# Patient Record
Sex: FEMALE | Race: BLACK OR AFRICAN AMERICAN | Employment: UNEMPLOYED | URBAN - METROPOLITAN AREA
[De-identification: names, ages, dates, MRNs, and addresses within clinical notes are randomized per-mention and may not be internally consistent; named-entity substitution may affect disease eponyms.]

---

## 2017-07-13 ENCOUNTER — HOSPITAL ENCOUNTER (EMERGENCY)
Facility: HOSPITAL | Age: 22
Discharge: HOME | End: 2017-07-13
Attending: EMERGENCY MEDICINE
Payer: MEDICAID

## 2017-07-13 VITALS
HEIGHT: 59 IN | DIASTOLIC BLOOD PRESSURE: 69 MMHG | TEMPERATURE: 98 F | BODY MASS INDEX: 19.56 KG/M2 | RESPIRATION RATE: 18 BRPM | OXYGEN SATURATION: 100 % | HEART RATE: 112 BPM | WEIGHT: 97 LBS | SYSTOLIC BLOOD PRESSURE: 142 MMHG

## 2017-07-13 DIAGNOSIS — R51.9 NONINTRACTABLE EPISODIC HEADACHE, UNSPECIFIED HEADACHE TYPE: Primary | ICD-10-CM

## 2017-07-13 LAB — B-HCG UR QL: NEGATIVE

## 2017-07-13 PROCEDURE — 81025 URINE PREGNANCY TEST: CPT

## 2017-07-13 PROCEDURE — 99283 EMERGENCY DEPT VISIT LOW MDM: CPT

## 2017-07-13 RX ORDER — IBUPROFEN 600 MG/1
600 TABLET ORAL ONCE
Status: COMPLETED | OUTPATIENT
Start: 2017-07-13 | End: 2017-07-13

## 2017-07-13 RX ORDER — IBUPROFEN 200 MG
200 TABLET ORAL EVERY 8 HOURS PRN
COMMUNITY

## 2017-07-13 RX ORDER — ACETAMINOPHEN 325 MG/1
650 TABLET ORAL ONCE
Status: COMPLETED | OUTPATIENT
Start: 2017-07-13 | End: 2017-07-13

## 2017-07-13 RX ORDER — BUTALBITAL, ACETAMINOPHEN AND CAFFEINE 50; 325; 40 MG/1; MG/1; MG/1
1-2 TABLET ORAL EVERY 4 HOURS PRN
Qty: 20 TABLET | Refills: 0 | Status: SHIPPED | OUTPATIENT
Start: 2017-07-13

## 2017-07-13 NOTE — ED INITIAL ASSESSMENT (HPI)
Pt c/o right sided headache. Medicated with 200 mg of Ibuprofen yesterday. States she was evaluated at [facility] ER in the past for headache and was given a CT scan.

## 2017-07-14 NOTE — ED NOTES
Pt describes having pain to occipital lobe of head intermittently X 3 weeks now worse at night. Pt denies any visual changes, nausea or vomiting. Not sensitive to light, smells or noise. Pt describes the sensation as \"it was getting hot.  Ice usually helps

## 2017-07-14 NOTE — ED PROVIDER NOTES
Patient Seen in: [facility] Emergency Department    History   Patient presents with:  Headache (neurologic)      HPI    The patient presents complaining of a right-sided headache.   She states that symptoms started roughly 3 weeks ago and pain has °C)  Temp src: Oral  SpO2: 100 %  O2 Device: None (Room air)    Physical Exam   Constitutional: She is oriented to person, place, and time. She appears well-developed and well-nourished. No distress. HENT:   Head: Normocephalic and atraumatic.    Eyes: Co summaries, testing, and procedures and reviewed those reports. Complicating Factors: The patient already has  does not have a problem list on file. to contribute to the complexity of this ED evaluation.     ED Course: Patient presents with intermittent h

## 2018-09-01 ENCOUNTER — HOSPITAL ENCOUNTER (EMERGENCY)
Facility: HOSPITAL | Age: 23
Discharge: HOME | End: 2018-09-01
Payer: COMMERCIAL

## 2018-09-01 VITALS
OXYGEN SATURATION: 97 % | RESPIRATION RATE: 18 BRPM | TEMPERATURE: 99 F | HEART RATE: 111 BPM | SYSTOLIC BLOOD PRESSURE: 128 MMHG | BODY MASS INDEX: 27.16 KG/M2 | WEIGHT: 125.88 LBS | DIASTOLIC BLOOD PRESSURE: 94 MMHG | HEIGHT: 57 IN

## 2018-09-01 DIAGNOSIS — N61.0 CELLULITIS OF FEMALE BREAST: Primary | ICD-10-CM

## 2018-09-01 LAB — B-HCG UR QL: NEGATIVE

## 2018-09-01 PROCEDURE — 81025 URINE PREGNANCY TEST: CPT

## 2018-09-01 PROCEDURE — 99283 EMERGENCY DEPT VISIT LOW MDM: CPT

## 2018-09-01 RX ORDER — CEPHALEXIN 500 MG/1
500 CAPSULE ORAL 4 TIMES DAILY
Qty: 28 CAPSULE | Refills: 0 | Status: SHIPPED | OUTPATIENT
Start: 2018-09-01 | End: 2018-09-08

## 2018-09-01 NOTE — ED NOTES
Pt here for painful breasts. Pt states she has had painful breasts x 1 week. Pt denies breastfeeding, denies pregnancy, states she is mid cycle in her period.  Pt states that her right breast feels more firm than the left and that she has expressed clear fl

## 2018-09-01 NOTE — ED PROVIDER NOTES
Patient Seen in: [facility] Emergency Department    History   CC: breast pain  HPI: Meron Jack 23year old female  who presents to the ER c/o right-sided lateral breast pain as well as some redness and reported clear discharge from her nipple in discharge noted, no periorbital edema  ENT - EAC bilaterally without discharge  Oropharynx clear, voice is clear  Neck - no significant adenopathy, supple with trachea midline  Breast - no sig axillary adenopathy, no swelling noted. No dx expressed.  +minim as of 9/1/2018  6:23 PM    START taking these medications    cephALEXin (KEFLEX) 500 MG Oral Cap  Take 1 capsule (500 mg total) by mouth 4 (four) times daily. , Normal, Disp-28 capsule, R-0

## 2020-03-13 ENCOUNTER — APPOINTMENT (OUTPATIENT)
Dept: GENERAL RADIOLOGY | Facility: HOSPITAL | Age: 25
End: 2020-03-13
Attending: EMERGENCY MEDICINE
Payer: COMMERCIAL

## 2020-03-13 PROCEDURE — 71045 X-RAY EXAM CHEST 1 VIEW: CPT | Performed by: EMERGENCY MEDICINE

## 2020-03-14 NOTE — ED NOTES
Patient presents to ER with c/o mid-sternal chest pain since Tuesday. States the pain has gotten worse today. Denies SOB or COLEMAN. Patient states she is has a lot of anxiety at this time.  Family at bedside

## 2020-03-14 NOTE — ED PROVIDER NOTES
Patient Seen in: [facility] AND [facility] Emergency Department      History   No chief complaint on file.     Stated Complaint: Chest Pain    HPI    33-year-old female with no significant past medical history here with complaints of chest pain for the past 3 day Physical Exam  Vitals signs and nursing note reviewed. Constitutional:       General: She is not in acute distress. Appearance: She is well-developed. She is not diaphoretic. HENT:      Head: Normocephalic and atraumatic.    Eyes:      Pupil 137 136 - 145 mmol/L    Potassium 3.5 3.5 - 5.1 mmol/L    Chloride 103 98 - 112 mmol/L    CO2 27.0 21.0 - 32.0 mmol/L    Anion Gap 7 0 - 18 mmol/L    BUN 9 7 - 18 mg/dL    Creatinine 0.75 0.55 - 1.02 mg/dL    BUN/CREA Ratio 12.0 10.0 - 20.0    Calcium, Tot evaluation.      24yoF with chest pain  - I personally reviewed and interpreted all the ED vitals  - afebrile, hemodynamically stable  - I ordered and personally reviewed the labs and imaging and found no leukocytosis, no anemia, negative pregnancy test, tr

## 2020-04-03 ENCOUNTER — HOSPITAL ENCOUNTER (EMERGENCY)
Facility: HOSPITAL | Age: 25
Discharge: HOME | End: 2020-04-03
Attending: EMERGENCY MEDICINE
Payer: COMMERCIAL

## 2020-04-03 VITALS
OXYGEN SATURATION: 99 % | DIASTOLIC BLOOD PRESSURE: 85 MMHG | RESPIRATION RATE: 18 BRPM | SYSTOLIC BLOOD PRESSURE: 128 MMHG | WEIGHT: 140 LBS | TEMPERATURE: 98 F | HEART RATE: 90 BPM | BODY MASS INDEX: 30 KG/M2

## 2020-04-03 DIAGNOSIS — J02.0 STREP PHARYNGITIS: Primary | ICD-10-CM

## 2020-04-03 PROCEDURE — 99283 EMERGENCY DEPT VISIT LOW MDM: CPT

## 2020-04-03 PROCEDURE — 87430 STREP A AG IA: CPT

## 2020-04-03 RX ORDER — CEFAZOLIN SODIUM/WATER 2 G/20 ML
SYRINGE (ML) INTRAVENOUS
Status: COMPLETED
Start: 2020-04-03 | End: 2020-04-03

## 2020-04-03 RX ORDER — PENICILLIN V POTASSIUM 500 MG/1
500 TABLET ORAL 4 TIMES DAILY
Qty: 40 TABLET | Refills: 0 | Status: SHIPPED | OUTPATIENT
Start: 2020-04-03 | End: 2020-04-13

## 2020-04-03 NOTE — ED PROVIDER NOTES
Patient Seen in: [facility] Emergency Department      History   Patient presents with:  Sore Throat    Stated Complaint: sore throat    HPI    28 y/o female w/ 5 days of sore throat without other associated symptoms of cough and fever.   She denies Alert and oriented to person, place, and time. Skin: Skin is warm and dry. Psychiatric: Normal mood and affect. Behavior is normal.   Nursing note and vitals reviewed.     Differential diagnosis includes viral syndrome, viral pharyngitis or strep phary

## 2021-01-10 ENCOUNTER — HOSPITAL ENCOUNTER (OUTPATIENT)
Age: 26
Discharge: HOME | End: 2021-01-10
Payer: COMMERCIAL

## 2021-01-10 VITALS
HEIGHT: 59 IN | RESPIRATION RATE: 18 BRPM | DIASTOLIC BLOOD PRESSURE: 81 MMHG | WEIGHT: 130 LBS | OXYGEN SATURATION: 100 % | BODY MASS INDEX: 26.21 KG/M2 | TEMPERATURE: 98 F | HEART RATE: 102 BPM | SYSTOLIC BLOOD PRESSURE: 118 MMHG

## 2021-01-10 DIAGNOSIS — U07.1 COVID-19: Primary | ICD-10-CM

## 2021-01-10 LAB
POCT INFLUENZA A: NEGATIVE
POCT INFLUENZA B: NEGATIVE
SARS-COV-2 RNA RESP QL NAA+PROBE: DETECTED

## 2021-01-10 PROCEDURE — 87502 INFLUENZA DNA AMP PROBE: CPT | Performed by: NURSE PRACTITIONER

## 2021-01-10 PROCEDURE — 99213 OFFICE O/P EST LOW 20 MIN: CPT

## 2021-01-10 NOTE — ED PROVIDER NOTES
Patient Seen in: [facility]      History   Patient presents with:   Body ache and/or chills    Stated Complaint: flu symptoms    HPI/Subjective:   HPI    82-year-old female with no significant past medical history here today with body aches, \ bilaterally without wheezes, rhonchi, crackles. No accessory muscle use or tachypnea. Abdomen: Soft, nontender, nondistended. Active bowel sounds present. Back: No CVA tenderness. Extremities: No edema. Pulses 2+ extremities.    Brisk cap

## 2021-10-02 ENCOUNTER — HOSPITAL ENCOUNTER (OUTPATIENT)
Age: 26
Discharge: HOME | End: 2021-10-02
Payer: COMMERCIAL

## 2021-10-02 VITALS
OXYGEN SATURATION: 100 % | SYSTOLIC BLOOD PRESSURE: 133 MMHG | TEMPERATURE: 98 F | DIASTOLIC BLOOD PRESSURE: 77 MMHG | RESPIRATION RATE: 18 BRPM | HEART RATE: 102 BPM

## 2021-10-02 DIAGNOSIS — Z20.822 LAB TEST NEGATIVE FOR COVID-19 VIRUS: ICD-10-CM

## 2021-10-02 DIAGNOSIS — J02.9 VIRAL PHARYNGITIS: Primary | ICD-10-CM

## 2021-10-02 DIAGNOSIS — R51.9 ACUTE NONINTRACTABLE HEADACHE, UNSPECIFIED HEADACHE TYPE: ICD-10-CM

## 2021-10-02 PROCEDURE — 87880 STREP A ASSAY W/OPTIC: CPT

## 2021-10-02 PROCEDURE — 99212 OFFICE O/P EST SF 10 MIN: CPT

## 2021-10-02 PROCEDURE — 99213 OFFICE O/P EST LOW 20 MIN: CPT

## 2021-10-02 NOTE — ED PROVIDER NOTES
Patient Seen in: [facility]      History   Patient presents with:  Sore Throat  Headache    Stated Complaint: Covid test    Subjective:   HPI    This is a 70-year-old female presenting with sore throat and headache.   Patient states, for 3 to General: Normal range of motion. Cervical back: Normal range of motion. Skin:     General: Skin is warm and dry. Capillary Refill: Capillary refill takes less than 2 seconds. Neurological:      General: No focal deficit present.       Mental

## (undated) NOTE — ED AVS SNAPSHOT
Devante Waldrop   MRN: [id number]    Department:  [facility] Emergency Department   Date of Visit:  9/1/2018           Disclosure     Insurance plans vary and the physician(s) referred by the ER may not be covered by your plan.  Please contact your CARE PHYSICIAN AT ONCE OR RETURN IMMEDIATELY TO THE EMERGENCY DEPARTMENT. If you have been prescribed any medication(s), please fill your prescription right away and begin taking the medication(s) as directed.   If you believe that any of the medications

## (undated) NOTE — LETTER
[facility] [address] [place] [address]  [phone]     Patient: Jean Pierre Alejandre   YOB: 1995   Date of Visit: 1/10/2021     Dear Employer,        January 10, 2021    At [facility], we are taking special precaut Persons infected with SARS-CoV-2 who never develop COVID-19 symptoms may discontinue isolation and other precautions 10 days after the date of their first positive RT-PCR test for SARS-CoV-2 RNA.     Persons who are asymptomatic but have been exposed, CDC r

## (undated) NOTE — ED AVS SNAPSHOT
[facility] Emergency Department  [address]   [place] [address]  Phone:  [phone]  Fax:  [phone] [facility]   MRN: [id number]    Department:  [facility] Emergency Department   Date of Visit:  7/13/2017 visiting [email or web address]    IF THERE IS ANY CHANGE OR WORSENING OF YOUR CONDITION, CALL YOUR PRIMARY CARE PHYSICIAN AT ONCE OR RETURN IMMEDIATELY TO THE EMERGENCY DEPARTMENT.     If you have been prescribed any medication(s), please fill your prescription

## (undated) NOTE — ED AVS SNAPSHOT
oRgerio Mercer   MRN: [id number]    Department:  [facility] Emergency Department   Date of Visit:  3/13/2020           Disclosure     Insurance plans vary and the physician(s) referred by the ER may not be covered by your plan.  Please contact you CARE PHYSICIAN AT ONCE OR RETURN IMMEDIATELY TO THE EMERGENCY DEPARTMENT. If you have been prescribed any medication(s), please fill your prescription right away and begin taking the medication(s) as directed.   If you believe that any of the medications